# Patient Record
Sex: MALE | Race: WHITE | NOT HISPANIC OR LATINO | Employment: FULL TIME | URBAN - METROPOLITAN AREA
[De-identification: names, ages, dates, MRNs, and addresses within clinical notes are randomized per-mention and may not be internally consistent; named-entity substitution may affect disease eponyms.]

---

## 2023-08-30 LAB
ALANINE AMINOTRANSFERASE (SGPT) (U/L) IN SER/PLAS: 13 U/L (ref 10–52)
ALBUMIN (G/DL) IN SER/PLAS: 4.5 G/DL (ref 3.4–5)
ALKALINE PHOSPHATASE (U/L) IN SER/PLAS: 70 U/L (ref 33–120)
ANION GAP IN SER/PLAS: 11 MMOL/L (ref 10–20)
ASPARTATE AMINOTRANSFERASE (SGOT) (U/L) IN SER/PLAS: 15 U/L (ref 9–39)
BASOPHILS (10*3/UL) IN BLOOD BY AUTOMATED COUNT: 0.03 X10E9/L (ref 0–0.1)
BASOPHILS/100 LEUKOCYTES IN BLOOD BY AUTOMATED COUNT: 0.5 % (ref 0–2)
BILIRUBIN TOTAL (MG/DL) IN SER/PLAS: 0.6 MG/DL (ref 0–1.2)
CALCIUM (MG/DL) IN SER/PLAS: 9.7 MG/DL (ref 8.6–10.3)
CARBON DIOXIDE, TOTAL (MMOL/L) IN SER/PLAS: 28 MMOL/L (ref 21–32)
CHLORIDE (MMOL/L) IN SER/PLAS: 106 MMOL/L (ref 98–107)
CHOLESTEROL (MG/DL) IN SER/PLAS: 160 MG/DL (ref 0–199)
CHOLESTEROL IN HDL (MG/DL) IN SER/PLAS: 63.9 MG/DL
CHOLESTEROL/HDL RATIO: 2.5
CREATININE (MG/DL) IN SER/PLAS: 1.08 MG/DL (ref 0.5–1.3)
EOSINOPHILS (10*3/UL) IN BLOOD BY AUTOMATED COUNT: 0.12 X10E9/L (ref 0–0.7)
EOSINOPHILS/100 LEUKOCYTES IN BLOOD BY AUTOMATED COUNT: 1.8 % (ref 0–6)
ERYTHROCYTE DISTRIBUTION WIDTH (RATIO) BY AUTOMATED COUNT: 13.1 % (ref 11.5–14.5)
ERYTHROCYTE MEAN CORPUSCULAR HEMOGLOBIN CONCENTRATION (G/DL) BY AUTOMATED: 33.4 G/DL (ref 32–36)
ERYTHROCYTE MEAN CORPUSCULAR VOLUME (FL) BY AUTOMATED COUNT: 91 FL (ref 80–100)
ERYTHROCYTES (10*6/UL) IN BLOOD BY AUTOMATED COUNT: 4.61 X10E12/L (ref 4.5–5.9)
GFR MALE: 88 ML/MIN/1.73M2
GLUCOSE (MG/DL) IN SER/PLAS: 88 MG/DL (ref 74–99)
HEMATOCRIT (%) IN BLOOD BY AUTOMATED COUNT: 41.9 % (ref 41–52)
HEMOGLOBIN (G/DL) IN BLOOD: 14 G/DL (ref 13.5–17.5)
IMMATURE GRANULOCYTES/100 LEUKOCYTES IN BLOOD BY AUTOMATED COUNT: 0.3 % (ref 0–0.9)
LDL: 84 MG/DL (ref 0–99)
LEUKOCYTES (10*3/UL) IN BLOOD BY AUTOMATED COUNT: 6.6 X10E9/L (ref 4.4–11.3)
LYMPHOCYTES (10*3/UL) IN BLOOD BY AUTOMATED COUNT: 2.15 X10E9/L (ref 1.2–4.8)
LYMPHOCYTES/100 LEUKOCYTES IN BLOOD BY AUTOMATED COUNT: 32.8 % (ref 13–44)
MONOCYTES (10*3/UL) IN BLOOD BY AUTOMATED COUNT: 0.51 X10E9/L (ref 0.1–1)
MONOCYTES/100 LEUKOCYTES IN BLOOD BY AUTOMATED COUNT: 7.8 % (ref 2–10)
NEUTROPHILS (10*3/UL) IN BLOOD BY AUTOMATED COUNT: 3.72 X10E9/L (ref 1.2–7.7)
NEUTROPHILS/100 LEUKOCYTES IN BLOOD BY AUTOMATED COUNT: 56.8 % (ref 40–80)
PLATELETS (10*3/UL) IN BLOOD AUTOMATED COUNT: 310 X10E9/L (ref 150–450)
POTASSIUM (MMOL/L) IN SER/PLAS: 4.2 MMOL/L (ref 3.5–5.3)
PROTEIN TOTAL: 7 G/DL (ref 6.4–8.2)
SODIUM (MMOL/L) IN SER/PLAS: 141 MMOL/L (ref 136–145)
TRIGLYCERIDE (MG/DL) IN SER/PLAS: 61 MG/DL (ref 0–149)
UREA NITROGEN (MG/DL) IN SER/PLAS: 13 MG/DL (ref 6–23)
VLDL: 12 MG/DL (ref 0–40)

## 2023-09-25 PROBLEM — N20.0 RECURRENT KIDNEY STONES: Status: ACTIVE | Noted: 2023-09-25

## 2023-09-25 PROBLEM — B35.1 ONYCHOMYCOSIS OF TOENAIL: Status: ACTIVE | Noted: 2023-09-25

## 2023-09-25 RX ORDER — MULTIVITAMIN
1 TABLET ORAL DAILY
COMMUNITY

## 2023-09-25 RX ORDER — TERBINAFINE HYDROCHLORIDE 250 MG/1
250 TABLET ORAL DAILY
COMMUNITY
End: 2023-10-04 | Stop reason: SDUPTHER

## 2023-09-25 RX ORDER — NAPROXEN 500 MG/1
500 TABLET ORAL 2 TIMES DAILY PRN
COMMUNITY
Start: 2022-12-08

## 2023-10-02 ENCOUNTER — LAB (OUTPATIENT)
Dept: LAB | Facility: LAB | Age: 43
End: 2023-10-02
Payer: COMMERCIAL

## 2023-10-02 DIAGNOSIS — B35.1 TINEA UNGUIUM: Primary | ICD-10-CM

## 2023-10-02 LAB
ALBUMIN SERPL BCP-MCNC: 4.7 G/DL (ref 3.4–5)
ALP SERPL-CCNC: 77 U/L (ref 33–120)
ALT SERPL W P-5'-P-CCNC: 14 U/L (ref 10–52)
ANION GAP SERPL CALC-SCNC: 10 MMOL/L (ref 10–20)
AST SERPL W P-5'-P-CCNC: 13 U/L (ref 9–39)
BILIRUB SERPL-MCNC: 0.6 MG/DL (ref 0–1.2)
BUN SERPL-MCNC: 14 MG/DL (ref 6–23)
CALCIUM SERPL-MCNC: 9.5 MG/DL (ref 8.6–10.3)
CHLORIDE SERPL-SCNC: 107 MMOL/L (ref 98–107)
CO2 SERPL-SCNC: 29 MMOL/L (ref 21–32)
CREAT SERPL-MCNC: 1.03 MG/DL (ref 0.5–1.3)
GFR SERPL CREATININE-BSD FRML MDRD: >90 ML/MIN/1.73M*2
GLUCOSE SERPL-MCNC: 99 MG/DL (ref 74–99)
POTASSIUM SERPL-SCNC: 4.4 MMOL/L (ref 3.5–5.3)
PROT SERPL-MCNC: 7 G/DL (ref 6.4–8.2)
SODIUM SERPL-SCNC: 142 MMOL/L (ref 136–145)

## 2023-10-02 PROCEDURE — 36415 COLL VENOUS BLD VENIPUNCTURE: CPT

## 2023-10-04 ENCOUNTER — OFFICE VISIT (OUTPATIENT)
Dept: UROLOGY | Facility: CLINIC | Age: 43
End: 2023-10-04
Payer: COMMERCIAL

## 2023-10-04 VITALS
TEMPERATURE: 97.7 F | HEIGHT: 71 IN | HEART RATE: 74 BPM | SYSTOLIC BLOOD PRESSURE: 131 MMHG | DIASTOLIC BLOOD PRESSURE: 81 MMHG | WEIGHT: 210 LBS | BODY MASS INDEX: 29.4 KG/M2

## 2023-10-04 DIAGNOSIS — N20.0 KIDNEY STONE: Primary | ICD-10-CM

## 2023-10-04 DIAGNOSIS — B35.1 ONYCHOMYCOSIS OF TOENAIL: Primary | ICD-10-CM

## 2023-10-04 LAB
POC APPEARANCE, URINE: CLEAR
POC BILIRUBIN, URINE: NEGATIVE
POC BLOOD, URINE: NEGATIVE
POC COLOR, URINE: YELLOW
POC GLUCOSE, URINE: NEGATIVE MG/DL
POC KETONES, URINE: NEGATIVE MG/DL
POC LEUKOCYTES, URINE: NEGATIVE
POC NITRITE,URINE: NEGATIVE
POC PH, URINE: 6.5 PH
POC PROTEIN, URINE: NEGATIVE MG/DL
POC SPECIFIC GRAVITY, URINE: 1.02
POC UROBILINOGEN, URINE: 0.2 EU/DL

## 2023-10-04 PROCEDURE — 81003 URINALYSIS AUTO W/O SCOPE: CPT | Performed by: STUDENT IN AN ORGANIZED HEALTH CARE EDUCATION/TRAINING PROGRAM

## 2023-10-04 PROCEDURE — 99204 OFFICE O/P NEW MOD 45 MIN: CPT | Performed by: STUDENT IN AN ORGANIZED HEALTH CARE EDUCATION/TRAINING PROGRAM

## 2023-10-04 PROCEDURE — 99406 BEHAV CHNG SMOKING 3-10 MIN: CPT | Performed by: STUDENT IN AN ORGANIZED HEALTH CARE EDUCATION/TRAINING PROGRAM

## 2023-10-04 RX ORDER — TERBINAFINE HYDROCHLORIDE 250 MG/1
250 TABLET ORAL DAILY
Qty: 90 TABLET | Refills: 1 | Status: SHIPPED | OUTPATIENT
Start: 2023-10-04

## 2023-10-04 NOTE — PROGRESS NOTES
Felix presents as a new patient for an evaluation.  The patient’s EMR has been reviewed.  Lives in [place], [place].  Occupation: .  PCP: Dr. Marianne Angelo MD.    SUBJECTIVE:  HPI   H/o nephrolithiasis (calcium oxalate).  Required URS/LL + stent in the past.   Last treated for stones in April 2020 with Dr. Neff.   Denies stone recurrence since that time.  Denies any recent imaging over the last couple years.   Seeking to reestablish urologic care today.     States he has experienced some residual intermittent discomfort of his L flank since last ureteral stent removal in 2020. Otherwise, denies any recent UTIs, gross hematuria, nausea, vomiting, fevers or chills.     Reports he has been drinking a lot of water for stone prevention.     Denies any significant PMHx.  PSHx ganglion cyst excision, lithotripsy  FHx of pancreatic cancer, and nephrolithiasis (calcium oxalate)  Smokes 2x cigars/week  IPSS 11 (frequency), ROBINA 24    Past medical, surgical, family and social history in the chart was reviewed and is accurate including any additions to what is in this HPI.    Review of Systems   Constitutional: denies any unintentional weight loss or change in strength.  Integumentary: denies any rashes or pruritus.  Eyes: denies any double vision or eye pain.  Ear/Nose/Mouth/Throat: denies any nosebleeds or gum bleeds.  Cardiovascular: denies any chest pain or syncope.  Respiratory: denies hemoptysis.  Gastrointestinal: denies nausea or vomiting.  Musculoskeletal: denies muscle cramping or weakness.  Neurologic: denies convulsions or seizures.  Hematologic/Lymphatic: denies bleeding tendencies.  Endocrine: denies heat/cold intolerance.  All other systems have been reviewed and are negative unless otherwise noted in the HPI.    OBJECTIVE:  Visit Vitals  /81   Pulse 74   Temp 36.5 °C (97.7 °F) (Temporal)     Physical Exam   Constitutional: No obvious distress.  Eyes: Non-injected conjunctiva, sclera clear,  EOMI.  Ears/Nose/Mouth/Throat: No obvious drainage per ears or nose.  Cardiovascular: Extremities are warm and well perfused. No edema, cyanosis or pallor.  Respiratory: No audible wheezing/stridor; respirations do not appear labored.  Gastrointestinal: Abdomen soft, not distended.  Musculoskeletal: Normal ROM of extremities.  Skin: No obvious rashes or open sores.  Neurologic: Alert and oriented, CN 2-12 grossly intact.  Psychiatric: Answers questions appropriately with normal affect.  Hematologic/Lymphatic/Immunologic: No obvious bruises or sites of spontaneous bleeding.  Genitourinary: No CVA tenderness, bladder not palpable.     Labs and imaging personally reviewed:  Lab Results   Component Value Date    WBC 6.6 08/30/2023    HGB 14.0 08/30/2023    HCT 41.9 08/30/2023     08/30/2023    CHOL 160 08/30/2023    TRIG 61 08/30/2023    HDL 63.9 08/30/2023    ALT 14 10/02/2023    AST 13 10/02/2023     10/02/2023    K 4.4 10/02/2023     10/02/2023    CREATININE 1.03 10/02/2023    BUN 14 10/02/2023    CO2 29 10/02/2023     ASSESSMENT:  Problem List Items Addressed This Visit    None  Visit Diagnoses       Kidney stone    -  Primary    Relevant Orders    POCT UA Automated manually resulted (Completed)    XR abdomen 1 view    US renal complete    Follow Up In Urology     PLAN:  IO urinalysis negative for blood or infection.  Plan to proceed with RBUS/KUB for initial workup.  Will RTC in 3-4 weeks to review results.     Patient was educated on stone prevention dietary modifications which include increased water intake, citric acid supplementation in the form of lemon juice, low oxalate diet, moderate protein intake and low sodium intake. In addition, suggested avoiding dark-colored sodas. A daily urine output of 2.5 L is also recommended if feasible.     We discussed smoking cessation for >3 minutes.  I advised this is the best thing the patient can do for their health.  Patient acknowledged  understanding.  He is not interested in lozenges or quitting at this time.    All questions were answered to the patient’s satisfaction.  Patient agrees with the plan and wishes to proceed.  Follow-up will be scheduled appropriately.     Scribed for Dr. Maciel Connolly by Lee Verduzco.  I, Dr. Maciel Connolly have personally reviewed and agreed with the information entered by the Virtual Scribe. 10/04/23, 9:29 AM

## 2023-10-17 ENCOUNTER — ANCILLARY PROCEDURE (OUTPATIENT)
Dept: RADIOLOGY | Facility: CLINIC | Age: 43
End: 2023-10-17
Payer: COMMERCIAL

## 2023-10-17 DIAGNOSIS — N20.0 KIDNEY STONE: ICD-10-CM

## 2023-10-17 PROCEDURE — 74018 RADEX ABDOMEN 1 VIEW: CPT | Mod: FY

## 2023-10-17 PROCEDURE — 74018 RADEX ABDOMEN 1 VIEW: CPT | Performed by: RADIOLOGY

## 2023-11-06 ENCOUNTER — HOSPITAL ENCOUNTER (OUTPATIENT)
Dept: RADIOLOGY | Facility: HOSPITAL | Age: 43
Discharge: HOME | End: 2023-11-06
Payer: COMMERCIAL

## 2023-11-06 DIAGNOSIS — N20.0 KIDNEY STONE: ICD-10-CM

## 2023-11-06 PROCEDURE — 76770 US EXAM ABDO BACK WALL COMP: CPT | Performed by: RADIOLOGY

## 2023-11-06 PROCEDURE — 76770 US EXAM ABDO BACK WALL COMP: CPT

## 2023-11-08 ENCOUNTER — APPOINTMENT (OUTPATIENT)
Dept: UROLOGY | Facility: CLINIC | Age: 43
End: 2023-11-08
Payer: COMMERCIAL

## 2023-11-15 ENCOUNTER — OFFICE VISIT (OUTPATIENT)
Dept: UROLOGY | Facility: CLINIC | Age: 43
End: 2023-11-15
Payer: COMMERCIAL

## 2023-11-15 VITALS
TEMPERATURE: 98.8 F | SYSTOLIC BLOOD PRESSURE: 113 MMHG | WEIGHT: 221 LBS | DIASTOLIC BLOOD PRESSURE: 77 MMHG | BODY MASS INDEX: 29.93 KG/M2 | HEIGHT: 72 IN | HEART RATE: 78 BPM

## 2023-11-15 DIAGNOSIS — N20.0 RECURRENT KIDNEY STONES: Primary | ICD-10-CM

## 2023-11-15 PROCEDURE — 99406 BEHAV CHNG SMOKING 3-10 MIN: CPT | Performed by: STUDENT IN AN ORGANIZED HEALTH CARE EDUCATION/TRAINING PROGRAM

## 2023-11-15 PROCEDURE — 99214 OFFICE O/P EST MOD 30 MIN: CPT | Performed by: STUDENT IN AN ORGANIZED HEALTH CARE EDUCATION/TRAINING PROGRAM

## 2023-11-15 NOTE — PROGRESS NOTES
Felix presents for a FU evaluation.  The patient’s EMR has been reviewed.  Lives in [place], [place].  Occupation: .  PCP: Dr. Marianne Angelo MD.    H/o nephrolithiasis (calcium oxalate).  Required URS/LL + stent in the past.   Last treated for stones in April 2020 with Dr. Neff.   Denies stone recurrence since that time.    SUBJECTIVE: HPI   TODAY (11/15/23)  Presents today for FU and review latest RBUS/KUB results.  RBUS (10/04/23) showed non-obstructing LEFT nephrolithiasis.   KUB (10/04/23) was negative for visible stones.   Findings reviewed with patient today.     For stone prevention.  He has increased his daily fluid intake.   Adds lemon juice intermittently to this water as well.  Avoids dark-colored sodas.    Denies any recent UTI's, gross hematuria, fevers or chills.   No acute or worsening urinary issues.     TO REVIEW: Initial evaluation (10/04/23)  H/o nephrolithiasis (calcium oxalate).  Required URS/LL + stent in the past.   Last treated for stones in April 2020 with Dr. Neff.   Denies stone recurrence since that time.  Denies any recent imaging over the last couple years.     States he has experienced some residual intermittent discomfort of his L flank since last ureteral stent removal in 2020. Otherwise, denies any recent UTIs, gross hematuria, nausea, vomiting, fevers or chills.      Reports he has been drinking a lot of water for stone prevention.      Denies any significant PMHx.  PSHx ganglion cyst excision, lithotripsy  FHx of pancreatic cancer, and nephrolithiasis (calcium oxalate)  Smokes 2x cigars/week  IPSS 11 (frequency), ROBINA 24    Past medical, surgical, family and social history in the chart was reviewed and is accurate including any additions to what is in this HPI.    Review of Systems   Constitutional: denies any unintentional weight loss or change in strength.  Integumentary: denies any rashes or pruritus.  Eyes: denies any double vision or eye  pain.  Ear/Nose/Mouth/Throat: denies any nosebleeds or gum bleeds.  Cardiovascular: denies any chest pain or syncope.  Respiratory: denies hemoptysis.  Gastrointestinal: denies nausea or vomiting.  Musculoskeletal: denies muscle cramping or weakness.  Neurologic: denies convulsions or seizures.  Hematologic/Lymphatic: denies bleeding tendencies.  Endocrine: denies heat/cold intolerance.  All other systems have been reviewed and are negative unless otherwise noted in the HPI.    OBJECTIVE:  There were no vitals taken for this visit.  Physical Exam   Constitutional: No obvious distress.  Eyes: Non-injected conjunctiva, sclera clear, EOMI.  Ears/Nose/Mouth/Throat: No obvious drainage per ears or nose.  Cardiovascular: Extremities are warm and well perfused. No edema, cyanosis or pallor.  Respiratory: No audible wheezing/stridor; respirations do not appear labored.  Gastrointestinal: Abdomen soft, not distended.  Musculoskeletal: Normal ROM of extremities.  Skin: No obvious rashes or open sores.  Neurologic: Alert and oriented, CN 2-12 grossly intact.  Psychiatric: Answers questions appropriately with normal affect.  Hematologic/Lymphatic/Immunologic: No obvious bruises or sites of spontaneous bleeding.  Genitourinary: No CVA tenderness, bladder not palpable.     Labs and imaging:  Lab Results   Component Value Date    WBC 6.6 08/30/2023    HGB 14.0 08/30/2023    HCT 41.9 08/30/2023     08/30/2023    CHOL 160 08/30/2023    TRIG 61 08/30/2023    HDL 63.9 08/30/2023    ALT 14 10/02/2023    AST 13 10/02/2023     10/02/2023    K 4.4 10/02/2023     10/02/2023    CREATININE 1.03 10/02/2023    BUN 14 10/02/2023    CO2 29 10/02/2023     ASSESSMENT:  Problem List Items Addressed This Visit       Recurrent kidney stones - Primary    Relevant Orders    US renal complete     1. Nephrolithiasis  2. Tobacco Abuse    PLAN:  RTC in 1 year with repeat RBUS prior to.     Patient was educated on stone prevention dietary  modifications which include increased water intake, citric acid supplementation in the form of lemon juice, low oxalate diet, moderate protein intake and low sodium intake. In addition, suggested avoiding dark-colored sodas. A daily urine output of 2.5 L is also recommended if feasible.     We discussed smoking cessation for >3 minutes.  I advised this is the best thing the patient can do for their health.  Patient acknowledged understanding.  Not interested in lozenges or quitting at this time.     All questions were answered to the patient’s satisfaction.  Patient agrees with the plan and wishes to proceed.    Scribed for Dr. Maciel Connolly by Lee Verduzco.  I, Dr. Maciel Connolly have personally reviewed and agreed with the information entered by the Virtual Scribe. 11/15/23.

## 2023-11-21 ENCOUNTER — TELEMEDICINE CLINICAL SUPPORT (OUTPATIENT)
Dept: GENETICS | Facility: CLINIC | Age: 43
End: 2023-11-21
Payer: COMMERCIAL

## 2023-11-21 DIAGNOSIS — Z80.0 FAMILY HISTORY OF PANCREATIC CANCER: ICD-10-CM

## 2023-11-21 PROCEDURE — 96040 PR MEDICAL GENETICS COUNSELING EACH 30 MINUTES: CPT | Performed by: GENETIC COUNSELOR, MS

## 2023-11-21 NOTE — PROGRESS NOTES
"History of Present Illness:  Felix GALARZA \"Flaquito\" Minerva is a 42 y.o. male with a family history of pancreatic cancer.  His wife (who was recently diagnosed with breast cancer in September) was recently seen in our cancer genetics clinic at , and it was recommended that Flaquito pursue his own genetic counseling based on his family history of pancreatic cancer in a first degree relative.  He presented today for a virtual visit. He is self-referred. He is interested in genetic testing to clarify his personal risk for cancer, as well as the risks to his family members.    Cancer medical history:  Personal history of cancer? No     Prior genetic testing? No     Cancer screening history:  Prostate? No   Colonoscopy? No  Upper endoscopy? No  Dermatology? No  Other cancer screening? No    His PCP is Dr. Angelo.      Smoking history:   - former cigarette smoker, from 2000 - 2007   - current cigar smoker, estimated 2-4 cigars per week depending on the season   - smoking cessation discussed    Family history:  A 4-generation pedigree was obtained and was significant for the following:   - Father with pancreatic cancer at age 60, +heavy smoker (3 packs per day)  - Paternal grandmother with lung cancer in her 70s, +heavy smoker  - No medical history information regarding other paternal relatives (aunts, uncles, cousins)   - No known maternal family history of cancer, but very limited information.     Maternal ancestry is Romanian and [place].  Paternal ancestry is Polish and Northern . There is no known Ashkenazi Mormon ancestry. Consanguinity was denied.     Genetic Counseling:    Mr. Palma is a 43 yo male whose family history is significant for pancreatic cancer in his father, who was a heavy smoker.  Mr. Palma has very little information about the rest of his family history, which makes it difficult to assess the likelihood of a genetic predisposition to cancer in the family. However, based on his family history of pancreatic " cancer in a first degree relative, Mr. Palma meets NCCN criteria for testing of pancreatic cancer susceptibility genes.     We reviewed genes and the concept of hereditary cancer. We discussed that most cancers are not due to an inherited genetic susceptibility. However, in about 5-10% of cases, there is an inherited genetic mutation that can make a person more susceptible to developing certain types of cancer. Within families with a genetic predisposition to cancer, we often see certain patterns, such as multiple family members with cancer and cancers occurring in multiple generations. In addition, earlier onset and/or bilateral cancers are suggestive of an inherited predisposition. There also tends to be a clustering of certain types of cancer in these families, such as breast cancer and pancreatic cancer.    Approximately 5-10% of pancreatic cancer is due to an inherited mutation. We discussed that there are no single-genes that are associated with only pancreatic cancer, but rather pancreatic cancer is an associated cancer with other hereditary cancer predisposition syndromes.     For example, mutations in the BRCA1 and BRCA2 genes are associated with early-onset breast cancer, ovarian cancer, and prostate cancer, and can also be associated with other cancers, such as pancreatic cancer. Approximately 2%-5% of unselected cases of pancreatic adenocarcinoma will have a BRCA1 or BRCA2 mutation.     In addition to BRCA1 and BRCA2, several other genes have been associated with increased pancreatic cancer risk, including JOVANNI, CDKN2A, the Llanos syndrome genes, PALB2, STK11, and TP53.     Mr. Palma was counseled about hereditary cancer susceptibility including cancer risks, options for increased screening and/or risk reduction, genetic testing, and the implications for other family members.     We discussed the Genetic Information Nondiscrimination Act of 2008 (JASON), which is a federal law that protects people from  genetic discrimination in health insurance and employment. There are some exceptions to JASON. JASON does not apply to employers with fewer than 15 employees. Additionally, the protections of JASON do not apply to federal employees enrolled in the Federal Employees Health Benefits program, members of the [place]  who receive their care through , and veterans who receive their care through the VA; however, these groups have policies in place that provide protections similar to JASON. We also discussed that JASON does not apply to life, disability, and long-termcare insurance.    We discussed performing genetic testing in the context of a multi-gene panel test that looks at genes associated with hereditary pancreatic cancer. Mr. Palma is interested in this approach.     Genetic testing was ordered today for Mr. Palma through [facility] via the [facility] panel +[facility].  This test analyzes the following 13 genes:  APC, JOVANNI, BRCA1, BRCA2, CDKN2A, EPCAM, MLH1, MSH2, MSH6, PALB2, PMS2, STK11, TP53    Results are typically available within 3-4 weeks after a sample is sent for testing.  Once he has had his blood drawn for testing, he will contact our office to schedule a follow up visit (in person or virtual) to discuss his test results.     Plan:  1. Mr. Palma elected to undergo genetic testing via the panel test described above. Verbal consent for testing was obtained. An [employer] DNA/RNA blood test kit will be sent to the patient's home. He will then bring the test kit to a  outpatient blood draw lab to have his blood drawn for testing (using the test tubes provided in the kit). The sample will then be sent to [facility] for analysis. Results are typically available in 3-4 weeks.    2.  He will be scheduled for a follow up visit (in person or telehealth) to discuss his test results. He will contact our office to schedule this visit once he has had his blood drawn for testing.     3. We remain available  to Mr. Palma at [phone] if any questions arise regarding the information discussed at today's visit.      Ramona Marquis MGC, [facility]  Licensed Genetic Counselor  [facility]  Ph: [phone]    Reviewed by:  Alanis Guo MD  Clinical   [facility]    Time spent with patient: 50 minutes

## 2023-12-21 ENCOUNTER — TELEPHONE (OUTPATIENT)
Dept: GENETICS | Facility: CLINIC | Age: 43
End: 2023-12-21
Payer: COMMERCIAL

## 2023-12-21 NOTE — TELEPHONE ENCOUNTER
I left a voicemail for this patient regarding an outstanding sample for genetic testing that was ordered about 1 mo ago. I requested the patient call back with their plans to proceed.

## 2024-01-24 NOTE — TELEPHONE ENCOUNTER
A return call has not been received by this patient since 1/9/2024. A sample submission letter has been mailed to his address.

## 2024-06-22 ENCOUNTER — HOSPITAL ENCOUNTER (OUTPATIENT)
Dept: RADIOLOGY | Facility: EXTERNAL LOCATION | Age: 44
Discharge: HOME | End: 2024-06-22
Payer: COMMERCIAL

## 2024-06-22 DIAGNOSIS — M54.50 LUMBAR SPINE PAIN: ICD-10-CM

## 2024-11-19 DIAGNOSIS — N20.0 RECURRENT KIDNEY STONES: ICD-10-CM

## 2024-11-20 ENCOUNTER — APPOINTMENT (OUTPATIENT)
Dept: UROLOGY | Facility: CLINIC | Age: 44
End: 2024-11-20
Payer: COMMERCIAL

## 2024-11-25 LAB
ALBUMIN SERPL BCP-MCNC: 4.7 G/DL (ref 3.4–5)
ALP SERPL-CCNC: 77 U/L (ref 33–120)
ALT SERPL W P-5'-P-CCNC: 14 U/L (ref 10–52)
ANION GAP SERPL CALC-SCNC: 10 MMOL/L
AST SERPL W P-5'-P-CCNC: 13 U/L (ref 9–39)
BILIRUB SERPL-MCNC: 0.6 MG/DL (ref 0–1.2)
BUN SERPL-MCNC: 14 MG/DL (ref 6–23)
CALCIUM SERPL-MCNC: 9.5 MG/DL (ref 8.6–10.3)
CHLORIDE SERPL-SCNC: 107 MMOL/L (ref 98–107)
CO2 SERPL-SCNC: 29 MMOL/L (ref 21–32)
CREAT SERPL-MCNC: 1.03 MG/DL (ref 0.5–1.3)
EGFRCR SERPLBLD CKD-EPI 2021: >90 ML/MIN/1.73M*2
GLUCOSE SERPL-MCNC: 99 MG/DL (ref 74–99)
POTASSIUM SERPL-SCNC: 4.4 MMOL/L (ref 3.5–5.3)
PROT SERPL-MCNC: 7 G/DL (ref 6.4–8.2)
SODIUM SERPL-SCNC: 142 MMOL/L (ref 136–145)

## 2024-12-06 ENCOUNTER — HOSPITAL ENCOUNTER (OUTPATIENT)
Dept: RADIOLOGY | Facility: HOSPITAL | Age: 44
Discharge: HOME | End: 2024-12-06
Payer: COMMERCIAL

## 2024-12-06 DIAGNOSIS — N20.0 RECURRENT KIDNEY STONES: ICD-10-CM

## 2024-12-06 PROCEDURE — 76770 US EXAM ABDO BACK WALL COMP: CPT | Performed by: RADIOLOGY

## 2024-12-06 PROCEDURE — 76770 US EXAM ABDO BACK WALL COMP: CPT

## 2024-12-18 ENCOUNTER — APPOINTMENT (OUTPATIENT)
Dept: UROLOGY | Facility: CLINIC | Age: 44
End: 2024-12-18
Payer: COMMERCIAL

## 2024-12-18 VITALS — TEMPERATURE: 97.9 F | SYSTOLIC BLOOD PRESSURE: 137 MMHG | HEART RATE: 58 BPM | DIASTOLIC BLOOD PRESSURE: 58 MMHG

## 2024-12-18 DIAGNOSIS — N20.0 RECURRENT KIDNEY STONES: Primary | ICD-10-CM

## 2024-12-18 PROCEDURE — 99213 OFFICE O/P EST LOW 20 MIN: CPT | Performed by: STUDENT IN AN ORGANIZED HEALTH CARE EDUCATION/TRAINING PROGRAM

## 2024-12-18 NOTE — PROGRESS NOTES
Scribed for Dr. Maciel Connolly by Lee Verduzco. I, Dr. Maciel Connolly have personally reviewed and agreed with the information entered by the Virtual Scribe. 12/18/24.    ASSESSMENT:  Problem List Items Addressed This Visit       Recurrent kidney stones - Primary     1. Nephrolithiasis  2. Tobacco Abuse    PLAN:  #Nephrolithiasis  Latest ultrasound negative for stones or hydronephrosis.   Denies passing a stone or stone-related symptoms over the past year.   May follow up with me PRN.    Discussion:  Patient was educated on stone prevention dietary modifications which include increased water intake, citric acid supplementation in the form of lemon juice, low oxalate diet, moderate protein intake and low sodium intake. In addition, suggested avoiding dark-colored sodas. A daily urine output of 2.5 L is also recommended if feasible.     All questions were answered to the patient’s satisfaction.  Patient agrees with the plan and wishes to proceed.  Continue follow-up for ongoing care of his chronic medical conditions.       History of Present Illness (HPI):  Felix presents for annual follow up.  The patient’s EMR has been reviewed.  Lives in [place], [place].  Occupation: .  PCP: Dr. Marianne Angelo MD.    Hx nephrolithiasis (calcium oxalate).  Required URS/LL + stent in the past.   Last treated for stones in April 2020 with Dr. Neff.   Denies stone recurrence since that time.  Continues annual follow up for stone surveillance.     TODAY: (12/18/24)  Presents for annual follow up and ultrasound results.   Reports he has been doing well overall.   No acute or worsening complaints.   No UTI's, gross hematuria, or flank pain in the interim.   He does not believe he has passed a stone over the past year.     For stone prevention.  He has increased his daily fluid intake.   Adds lemon juice intermittently to this water.  Takes dietary supplements including citric acid.   Avoids dark-colored sodas.    RBUS (12/06/24)  personally reviewed.   No stones identified within kidneys, ureters, or bladder.   No hydronephrosis bilaterally.  No focal urinary bladder abnormality identified.  Mildly enlarged prostate.    TO REVIEW: Last visit (11/15/23)  Presents today for FU and RBUS/KUB results.  RBUS (10/04/23) showed non-obstructing LEFT nephrolithiasis.   KUB (10/04/23) was negative for visible stones.   Findings reviewed with patient today.     For stone prevention.  He has increased his daily fluid intake.   Adds lemon juice intermittently to this water as well.  Avoids dark-colored sodas.    Denies any recent UTI's, gross hematuria, fevers or chills.   No acute or worsening urinary issues.     TO REVIEW: Initial visit (10/04/23)  H/o nephrolithiasis (calcium oxalate).  Required URS/LL + stent in the past.   Last treated for stones in April 2020 with Dr. Neff.   Denies stone recurrence since that time.  Denies any recent imaging over the last couple years.   Reports he has been drinking a lot of water for stone prevention.     States he has experienced some residual intermittent discomfort of his L flank since last ureteral stent removal in 2020. Otherwise, denies any recent UTIs, gross hematuria, nausea, vomiting, fevers or chills.      IPSS 11 (frequency), ROBINA 24    PMH: Denies   PSH: ganglion cyst excision, lithotripsy  FH: pancreatic cancer (Father), and nephrolithiasis (calcium oxalate)  SH: Smokes 2x cigars/week    History reviewed. No pertinent past medical history.  History reviewed. No pertinent surgical history.  Family History   Problem Relation Name Age of Onset    Pancreatic cancer Father      Kidney nephrosis Other sibling         calcium oxalate     Social History     Tobacco Use   Smoking Status Former    Types: Cigars   Smokeless Tobacco Former     Current Outpatient Medications   Medication Sig Dispense Refill    multivitamin tablet Take 1 tablet by mouth once daily.      naproxen (Naprosyn) 500 mg tablet Take 1  tablet (500 mg) by mouth 2 times a day as needed.      terbinafine (LamISIL) 250 mg tablet Take 1 tablet (250 mg) by mouth once daily. (Patient not taking: Reported on 12/18/2024) 90 tablet 1     No current facility-administered medications for this visit.     No Known Allergies  Past medical, surgical, family and social history in the chart was reviewed and is accurate including any additions to what is in this HPI.    REVIEW OF SYSTEMS (ROS):   Constitutional: denies any unintentional weight loss or change in strength.  Integumentary: denies any rashes or pruritus.  Eyes: denies any double vision or eye pain.  Ear/Nose/Mouth/Throat: denies any nosebleeds or gum bleeds.  Cardiovascular: denies any chest pain or syncope.  Respiratory: denies hemoptysis.  Gastrointestinal: denies nausea or vomiting.  Musculoskeletal: denies muscle cramping or weakness.  Neurologic: denies convulsions or seizures.  Hematologic/Lymphatic: denies bleeding tendencies.  Endocrine: denies heat/cold intolerance.  All other systems have been reviewed and are negative unless otherwise noted in the HPI.     OBJECTIVE:  Visit Vitals  /58   Pulse 58   Temp 36.6 °C (97.9 °F)     PHYSICAL EXAM:  Constitutional: No obvious distress.  Eyes: Non-injected conjunctiva, sclera clear, EOMI.  Ears/Nose/Mouth/Throat: No obvious drainage per ears or nose.  Cardiovascular: Extremities are warm and well perfused. No edema, cyanosis or pallor.  Respiratory: No audible wheezing/stridor; respirations do not appear labored.  Gastrointestinal: Abdomen soft, not distended.  Musculoskeletal: Normal ROM of extremities.  Skin: No obvious rashes or open sores.  Neurologic: Alert and oriented, CN 2-12 grossly intact.  Psychiatric: Answers questions appropriately with normal affect.  Hematologic/Lymphatic/Immunologic: No obvious bruises or sites of spontaneous bleeding.  Genitourinary: No CVA tenderness, bladder not palpable.     LABS & IMAGING:  Basic Labs:  Lab  "Results   Component Value Date    WBC 6.6 08/30/2023    HGB 14.0 08/30/2023    HCT 41.9 08/30/2023     08/30/2023    CHOL 160 08/30/2023    TRIG 61 08/30/2023    HDL 63.9 08/30/2023    ALT 14 10/02/2023    AST 13 10/02/2023     10/02/2023    K 4.4 10/02/2023     10/02/2023    CREATININE 1.03 10/02/2023    BUN 14 10/02/2023    CO2 29 10/02/2023     Prostate cancer screening:  No results found for: \"PSA\"  Hormone Labs:  No results found for: \"TESTOSTERONE\"  No results found for: \"LH\"  No results found for: \"FSH\"  No results found for: \"PROLACTIN\"  LIPID panel:  Lab Results   Component Value Date    CHOL 160 08/30/2023     Lab Results   Component Value Date    HDL 63.9 08/30/2023     Lab Results   Component Value Date    CHHDL 2.5 08/30/2023      No results found for: \"LDLCALC\"  Lab Results   Component Value Date    VLDL 12 08/30/2023     Lab Results   Component Value Date    TRIG 61 08/30/2023     Hemoglobin A1c:  No results found for: \"HGBA1C\"    Scribed for Dr. Maciel Connolly by Lee Verduzco.  By signing my name below, I, Yadira Santos attest that this documentation has been prepared under the direction and in the presence of Maciel Connolly MD. All medical record entries made by the Scribe were at my direction or personally dictated by me. I have reviewed the chart and agree that the record accurately reflects my personal performance of the history, physical exam, discussion and plan.    "

## 2025-08-08 ENCOUNTER — OFFICE VISIT (OUTPATIENT)
Dept: URGENT CARE | Age: 45
End: 2025-08-08
Payer: COMMERCIAL

## 2025-08-08 VITALS
TEMPERATURE: 98.1 F | DIASTOLIC BLOOD PRESSURE: 78 MMHG | HEIGHT: 72 IN | WEIGHT: 220 LBS | OXYGEN SATURATION: 98 % | RESPIRATION RATE: 20 BRPM | BODY MASS INDEX: 29.8 KG/M2 | HEART RATE: 88 BPM | SYSTOLIC BLOOD PRESSURE: 150 MMHG

## 2025-08-08 DIAGNOSIS — T14.8XXA PULLED MUSCLE: ICD-10-CM

## 2025-08-08 DIAGNOSIS — M54.50 LEFT-SIDED LOW BACK PAIN WITHOUT SCIATICA, UNSPECIFIED CHRONICITY: Primary | ICD-10-CM

## 2025-08-08 PROCEDURE — 3008F BODY MASS INDEX DOCD: CPT | Performed by: PHYSICIAN ASSISTANT

## 2025-08-08 PROCEDURE — 99213 OFFICE O/P EST LOW 20 MIN: CPT | Performed by: PHYSICIAN ASSISTANT

## 2025-08-08 RX ORDER — CYCLOBENZAPRINE HCL 10 MG
10 TABLET ORAL 3 TIMES DAILY
Qty: 20 TABLET | Refills: 0 | Status: SHIPPED | OUTPATIENT
Start: 2025-08-08 | End: 2025-08-18

## 2025-08-08 RX ORDER — IBUPROFEN 600 MG/1
600 TABLET, FILM COATED ORAL EVERY 6 HOURS PRN
Qty: 30 TABLET | Refills: 0 | Status: SHIPPED | OUTPATIENT
Start: 2025-08-08 | End: 2025-08-18

## 2025-08-08 NOTE — PROGRESS NOTES
"Subjective   Patient ID: Felix Palma is a 44 y.o. male. They present today with a chief complaint of Back Pain (Lifted garage door. Hurt back).    History of Present Illness      HPI   44-year-old gentleman who was lifting a large door this morning pulled his back while doing so.  Has pain on the left lower back region more than the right.  He is ambulatory.  He is not having any bowel bladder dysfunction.  Does have a history of some chronic back pain.        Past Medical History  Allergies as of 08/08/2025    (No Known Allergies)       Prescriptions Prior to Admission[1]     Medical History[2]    Surgical History[3]     reports that he has quit smoking. His smoking use included cigars. He has quit using smokeless tobacco. He reports that he does not currently use drugs. He reports that he does not drink alcohol.    Review of Systems  Review of Systems  Negative other than mentioned in HPI    ENT: No earache, no sore throat, no nosebleeds  Cardiovascular: No chest pain, no shortness of breath, no leg pain, no edema  Respiratory: No shortness of breath on exertion, no wheezing  Gastrointestinal: No abdominal pain, no melena, no nausea, vomiting and/or diarrhea  Musculoskeletal: No pain moving all extremities, no back pain ambulating normally  Back pain lower region  Skin: No rashes, no lesions, and no skin changes  Neuro: No headache, no confusion, no numbness and tingling  Psychiatric, normal mood, not suicidal, not homicidal, feeling good                               Objective    Vitals:    08/08/25 1109   BP: 150/78   Pulse: 88   Resp: 20   Temp: 36.7 °C (98.1 °F)   TempSrc: Oral   SpO2: 98%   Weight: 99.8 kg (220 lb)   Height: 1.816 m (5' 11.5\")     No LMP for male patient.    Physical Exam  Eyes: Conjunctiva non -icteric and eye lids are without obvious rash or drooping. Pupils are symmetric.   Ears, Nose, Mouth, and Throat: External ears and nose appear to be without deformity or rash. No lesions or " masses noted. Hearing is grossly intact.   Neck:. No JVD noted, tracheal position is midline. No thyromegaly, no thyroid nodules  Head and Face: Examination of the head and face revealed no abnormalities.   Respiratory: No gasping or shortness of breath noted, no use of accessory muscles noted.  Lungs are clear to auscultate  Cardiovascular: Examination for edema is normal, regular rate and rhythm S1-S2  GI: Abdomen non tender to palpation, bowel sounds are present  Skin: No rashes or open lesions/ulcers identified on skin.   Musk: Gait is normal.  Patient has 71-year-old male with right range of motion is intact but guarded due to his pain.  Negative straight leg raise  Neurologic: Cranial nerves II- XII intact, motor strength 5/5 muscle strength of the lower extremities bilaterally and equal.    Procedures    Point of Care Test & Imaging Results from this visit  No results found for this visit on 08/08/25.   Imaging  No results found.    Cardiology, Vascular, and Other Imaging  No other imaging results found for the past 2 days      Diagnostic study results (if any) were reviewed by [facility].    Assessment/Plan   Allergies, medications, history, and pertinent labs/EKGs/Imaging reviewed by Rashida Naqvi PA-C.     Medical Decision Making  Patient has a lumbar strain/muscle for lumbar tenderness.  Will treat with muscle relaxers and anti-inflammatories patient will follow up with this PCP next week.    Orders and Diagnoses  Diagnoses and all orders for this visit:  Left-sided low back pain without sciatica, unspecified chronicity  -     ibuprofen 600 mg tablet; Take 1 tablet (600 mg) by mouth every 6 hours if needed for mild pain (1 - 3) for up to 10 days.  -     cyclobenzaprine (Flexeril) 10 mg tablet; Take 1 tablet (10 mg) by mouth 3 times a day for 10 days.  Pulled muscle  -     ibuprofen 600 mg tablet; Take 1 tablet (600 mg) by mouth every 6 hours if needed for mild pain (1 - 3) for up to 10  days.  -     cyclobenzaprine (Flexeril) 10 mg tablet; Take 1 tablet (10 mg) by mouth 3 times a day for 10 days.      Medical Admin Record      Patient disposition: Home    Electronically signed by [facility]  11:47 AM           [1] (Not in a hospital admission)   [2] No past medical history on file.  [3] No past surgical history on file.

## 2025-10-20 ENCOUNTER — APPOINTMENT (OUTPATIENT)
Dept: PRIMARY CARE | Facility: CLINIC | Age: 45
End: 2025-10-20
Payer: COMMERCIAL